# Patient Record
Sex: MALE | Race: WHITE | NOT HISPANIC OR LATINO | Employment: UNEMPLOYED | URBAN - METROPOLITAN AREA
[De-identification: names, ages, dates, MRNs, and addresses within clinical notes are randomized per-mention and may not be internally consistent; named-entity substitution may affect disease eponyms.]

---

## 2021-04-20 ENCOUNTER — APPOINTMENT (OUTPATIENT)
Dept: GENERAL RADIOLOGY | Facility: CLINIC | Age: 36
End: 2021-04-20
Attending: NURSE PRACTITIONER
Payer: MEDICAID

## 2021-04-20 ENCOUNTER — APPOINTMENT (OUTPATIENT)
Dept: CT IMAGING | Facility: CLINIC | Age: 36
End: 2021-04-20
Attending: NURSE PRACTITIONER
Payer: MEDICAID

## 2021-04-20 ENCOUNTER — HOSPITAL ENCOUNTER (EMERGENCY)
Facility: CLINIC | Age: 36
Discharge: HOME | End: 2021-04-20
Attending: NURSE PRACTITIONER | Admitting: NURSE PRACTITIONER
Payer: MEDICAID

## 2021-04-20 ENCOUNTER — APPOINTMENT (OUTPATIENT)
Dept: MRI IMAGING | Facility: CLINIC | Age: 36
End: 2021-04-20
Attending: NURSE PRACTITIONER
Payer: MEDICAID

## 2021-04-20 VITALS
OXYGEN SATURATION: 99 % | DIASTOLIC BLOOD PRESSURE: 69 MMHG | SYSTOLIC BLOOD PRESSURE: 129 MMHG | WEIGHT: 241 LBS | HEART RATE: 101 BPM | TEMPERATURE: 98.1 F | RESPIRATION RATE: 16 BRPM

## 2021-04-20 DIAGNOSIS — R20.2 PARESTHESIAS: ICD-10-CM

## 2021-04-20 DIAGNOSIS — R42 DIZZINESS: ICD-10-CM

## 2021-04-20 LAB
ALBUMIN SERPL-MCNC: 4.1 G/DL (ref 3.4–5)
ALBUMIN UR-MCNC: NEGATIVE MG/DL
ALP SERPL-CCNC: 93 U/L (ref 40–150)
ALT SERPL W P-5'-P-CCNC: 49 U/L (ref 0–70)
ANION GAP SERPL CALCULATED.3IONS-SCNC: 4 MMOL/L (ref 3–14)
APPEARANCE UR: CLEAR
APTT PPP: 26 SEC (ref 22–37)
AST SERPL W P-5'-P-CCNC: 26 U/L (ref 0–45)
BASOPHILS # BLD AUTO: 0.1 10E9/L (ref 0–0.2)
BASOPHILS NFR BLD AUTO: 1.1 %
BILIRUB SERPL-MCNC: 0.5 MG/DL (ref 0.2–1.3)
BILIRUB UR QL STRIP: NEGATIVE
BUN SERPL-MCNC: 20 MG/DL (ref 7–30)
CALCIUM SERPL-MCNC: 9 MG/DL (ref 8.5–10.1)
CHLORIDE SERPL-SCNC: 103 MMOL/L (ref 94–109)
CO2 SERPL-SCNC: 29 MMOL/L (ref 20–32)
COLOR UR AUTO: ABNORMAL
CREAT SERPL-MCNC: 1.05 MG/DL (ref 0.66–1.25)
DIFFERENTIAL METHOD BLD: ABNORMAL
EOSINOPHIL # BLD AUTO: 0.2 10E9/L (ref 0–0.7)
EOSINOPHIL NFR BLD AUTO: 2.9 %
ERYTHROCYTE [DISTWIDTH] IN BLOOD BY AUTOMATED COUNT: 12.4 % (ref 10–15)
GFR SERPL CREATININE-BSD FRML MDRD: >90 ML/MIN/{1.73_M2}
GLUCOSE BLDC GLUCOMTR-MCNC: 83 MG/DL (ref 70–99)
GLUCOSE SERPL-MCNC: 90 MG/DL (ref 70–99)
GLUCOSE UR STRIP-MCNC: NEGATIVE MG/DL
HCT VFR BLD AUTO: 51.5 % (ref 40–53)
HGB BLD-MCNC: 17.9 G/DL (ref 13.3–17.7)
HGB UR QL STRIP: NEGATIVE
IMM GRANULOCYTES # BLD: 0 10E9/L (ref 0–0.4)
IMM GRANULOCYTES NFR BLD: 0.4 %
INR PPP: 1.02 (ref 0.86–1.14)
KETONES UR STRIP-MCNC: NEGATIVE MG/DL
LABORATORY COMMENT REPORT: NORMAL
LEUKOCYTE ESTERASE UR QL STRIP: NEGATIVE
LYMPHOCYTES # BLD AUTO: 1.7 10E9/L (ref 0.8–5.3)
LYMPHOCYTES NFR BLD AUTO: 21.4 %
MAGNESIUM SERPL-MCNC: 2.4 MG/DL (ref 1.6–2.3)
MCH RBC QN AUTO: 31 PG (ref 26.5–33)
MCHC RBC AUTO-ENTMCNC: 34.8 G/DL (ref 31.5–36.5)
MCV RBC AUTO: 89 FL (ref 78–100)
MONOCYTES # BLD AUTO: 0.6 10E9/L (ref 0–1.3)
MONOCYTES NFR BLD AUTO: 7 %
NEUTROPHILS # BLD AUTO: 5.4 10E9/L (ref 1.6–8.3)
NEUTROPHILS NFR BLD AUTO: 67.2 %
NITRATE UR QL: NEGATIVE
NRBC # BLD AUTO: 0 10*3/UL
NRBC BLD AUTO-RTO: 0 /100
PH UR STRIP: 6 PH (ref 5–7)
PLATELET # BLD AUTO: 258 10E9/L (ref 150–450)
POTASSIUM SERPL-SCNC: 3.9 MMOL/L (ref 3.4–5.3)
PROT SERPL-MCNC: 7.5 G/DL (ref 6.8–8.8)
RBC # BLD AUTO: 5.77 10E12/L (ref 4.4–5.9)
SARS-COV-2 RNA RESP QL NAA+PROBE: NEGATIVE
SODIUM SERPL-SCNC: 136 MMOL/L (ref 133–144)
SOURCE: ABNORMAL
SP GR UR STRIP: 1.04 (ref 1–1.03)
SPECIMEN SOURCE: NORMAL
TROPONIN I SERPL-MCNC: <0.015 UG/L (ref 0–0.04)
UROBILINOGEN UR STRIP-MCNC: 0 MG/DL (ref 0–2)
WBC # BLD AUTO: 8 10E9/L (ref 4–11)

## 2021-04-20 PROCEDURE — 250N000009 HC RX 250: Performed by: NURSE PRACTITIONER

## 2021-04-20 PROCEDURE — 99285 EMERGENCY DEPT VISIT HI MDM: CPT | Mod: 25 | Performed by: NURSE PRACTITIONER

## 2021-04-20 PROCEDURE — 70551 MRI BRAIN STEM W/O DYE: CPT

## 2021-04-20 PROCEDURE — 71045 X-RAY EXAM CHEST 1 VIEW: CPT

## 2021-04-20 PROCEDURE — 93005 ELECTROCARDIOGRAM TRACING: CPT | Performed by: NURSE PRACTITIONER

## 2021-04-20 PROCEDURE — 85730 THROMBOPLASTIN TIME PARTIAL: CPT | Performed by: NURSE PRACTITIONER

## 2021-04-20 PROCEDURE — 83735 ASSAY OF MAGNESIUM: CPT | Performed by: NURSE PRACTITIONER

## 2021-04-20 PROCEDURE — 85610 PROTHROMBIN TIME: CPT | Performed by: NURSE PRACTITIONER

## 2021-04-20 PROCEDURE — 93010 ELECTROCARDIOGRAM REPORT: CPT | Performed by: NURSE PRACTITIONER

## 2021-04-20 PROCEDURE — C9803 HOPD COVID-19 SPEC COLLECT: HCPCS | Performed by: NURSE PRACTITIONER

## 2021-04-20 PROCEDURE — 87635 SARS-COV-2 COVID-19 AMP PRB: CPT | Performed by: NURSE PRACTITIONER

## 2021-04-20 PROCEDURE — 85025 COMPLETE CBC W/AUTO DIFF WBC: CPT | Performed by: NURSE PRACTITIONER

## 2021-04-20 PROCEDURE — 70498 CT ANGIOGRAPHY NECK: CPT

## 2021-04-20 PROCEDURE — 250N000011 HC RX IP 250 OP 636: Performed by: NURSE PRACTITIONER

## 2021-04-20 PROCEDURE — 999N001017 HC STATISTIC GLUCOSE BY METER IP

## 2021-04-20 PROCEDURE — 70450 CT HEAD/BRAIN W/O DYE: CPT | Mod: XU

## 2021-04-20 PROCEDURE — 81001 URINALYSIS AUTO W/SCOPE: CPT | Performed by: NURSE PRACTITIONER

## 2021-04-20 PROCEDURE — 80053 COMPREHEN METABOLIC PANEL: CPT | Performed by: NURSE PRACTITIONER

## 2021-04-20 PROCEDURE — 84484 ASSAY OF TROPONIN QUANT: CPT | Performed by: NURSE PRACTITIONER

## 2021-04-20 RX ORDER — LISINOPRIL AND HYDROCHLOROTHIAZIDE 12.5; 2 MG/1; MG/1
1 TABLET ORAL DAILY
COMMUNITY
Start: 2021-03-26

## 2021-04-20 RX ORDER — HYDROCODONE BITARTRATE AND ACETAMINOPHEN 5; 325 MG/1; MG/1
1 TABLET ORAL EVERY 6 HOURS PRN
COMMUNITY
Start: 2021-04-09 | End: 2022-11-11

## 2021-04-20 RX ORDER — IOPAMIDOL 755 MG/ML
500 INJECTION, SOLUTION INTRAVASCULAR ONCE
Status: COMPLETED | OUTPATIENT
Start: 2021-04-20 | End: 2021-04-20

## 2021-04-20 RX ORDER — GABAPENTIN 600 MG/1
600 TABLET ORAL 3 TIMES DAILY
COMMUNITY
Start: 2021-04-09 | End: 2022-10-29 | Stop reason: ALTCHOICE

## 2021-04-20 RX ADMIN — IOPAMIDOL 80 ML: 755 INJECTION, SOLUTION INTRAVENOUS at 15:31

## 2021-04-20 RX ADMIN — SODIUM CHLORIDE 100 ML: 9 INJECTION, SOLUTION INTRAVENOUS at 15:31

## 2021-04-20 ASSESSMENT — ENCOUNTER SYMPTOMS
TREMORS: 0
SEIZURES: 0
ARTHRALGIAS: 1
WEAKNESS: 0
NECK PAIN: 1
ENDOCRINE NEGATIVE: 1
PHOTOPHOBIA: 0
NAUSEA: 0
CARDIOVASCULAR NEGATIVE: 1
VOMITING: 0
NERVOUS/ANXIOUS: 1
FEVER: 0
RESPIRATORY NEGATIVE: 1
HEMATOLOGIC/LYMPHATIC NEGATIVE: 1
CHILLS: 0

## 2021-04-20 NOTE — ED TRIAGE NOTES
Pt here with headache, tingling in right side of back of head.  Pressure in extremities, vision blurry.  Stroke eval called.    BG 83

## 2021-04-20 NOTE — Clinical Note
Landen Arteaga was seen and treated in our emergency department on 4/20/2021.  He may return to work on 04/21/2021.       If you have any questions or concerns, please don't hesitate to call.      Domenica Sanon APRN CNP

## 2021-04-20 NOTE — ED PROVIDER NOTES
History     Chief Complaint   Patient presents with     Headache     HPI  Landen Arteaga is a 35 year old male who has hypertension and anxiety and is a tobacco smoker 1 pack/day resenting to the emergency department with complaint of having diffuse numbness and tingling in his head, tongue tingling, tingling to his face in bilateral upper extremities after he had bent over and lift a box.  This occurred at approximately 1300 hrs.  Patient thought it was his blood pressure and did take an additional blood pressure medication but symptoms continued.  He is noted some blurry vision but no visual loss.  He denies severe thunderclap headache, neck stiffness, extremity weakness, no slurred speech, no facial droop.  Patient reports his symptoms does not feel like his normal anxiety attack.  Reports this has happened in the past to him as well.  Patient reports he would like an MRI to further evaluate for any abnormalities of his brain.    Patient denies chest pain, dyspnea, unusual cough and fever, he is not on any blood thinners, he denies illicit drug use and no history of snuffing.    After evaluation of the patient code stroke was changed to stroke evaluation.    Patient recently had a MRI of the cervical spine for cervical radiculopathy and chronic neck pain.  It appears he had this performed at [facility].    PCP: No primary care provider on file.     Allergies:  No Known Allergies    Problem List:    There are no active problems to display for this patient.       Past Medical History:    History reviewed. No pertinent past medical history.    Past Surgical History:    History reviewed. No pertinent surgical history.    Family History:    History reviewed. No pertinent family history.    Social History:  Marital Status:  Single [1]  Social History     Tobacco Use     Smoking status: Current Every Day Smoker     Packs/day: 1.00     Types: Cigarettes     Smokeless tobacco: Never Used    Substance Use Topics     Alcohol use: Not Currently     Drug use: Never        Medications:    gabapentin (NEURONTIN) 600 MG tablet  HYDROcodone-acetaminophen (NORCO) 5-325 MG tablet  lisinopril-hydrochlorothiazide (ZESTORETIC) 20-12.5 MG tablet          Review of Systems   Constitutional: Negative for chills and fever.   HENT: Negative.    Eyes: Negative for photophobia.   Respiratory: Negative.    Cardiovascular: Negative.    Gastrointestinal: Negative for nausea and vomiting.   Endocrine: Negative.    Genitourinary: Negative.    Musculoskeletal: Positive for arthralgias and neck pain.        Chronic neck and bilateral shoulder pain   Allergic/Immunologic: Negative for immunocompromised state.   Neurological: Negative for tremors, seizures and weakness.   Hematological: Negative.    Psychiatric/Behavioral: The patient is nervous/anxious.        Physical Exam   BP: (!) 148/88  Pulse: 104  Temp: 98.1  F (36.7  C)  Resp: 16  Weight: 109.3 kg (241 lb)  SpO2: 100 %      Physical Exam  Vitals signs and nursing note reviewed.   Constitutional:       Appearance: Normal appearance.   HENT:      Head: Normocephalic and atraumatic.      Jaw: There is normal jaw occlusion.      Mouth/Throat:      Lips: Pink.      Mouth: Mucous membranes are moist.      Tongue: Tongue does not deviate from midline.      Pharynx: Uvula midline.   Eyes:      General: No visual field deficit.     Extraocular Movements: Extraocular movements intact.      Conjunctiva/sclera: Conjunctivae normal.      Pupils: Pupils are equal, round, and reactive to light.   Neck:      Musculoskeletal: Normal range of motion and neck supple. No neck rigidity.      Vascular: No carotid bruit.   Cardiovascular:      Rate and Rhythm: Normal rate and regular rhythm.      Pulses: Normal pulses.      Heart sounds: Normal heart sounds.   Pulmonary:      Effort: Pulmonary effort is normal.      Breath sounds: Normal breath sounds.   Abdominal:      General: Bowel sounds  are normal.      Palpations: Abdomen is soft.   Musculoskeletal:      Right lower leg: No edema.      Left lower leg: No edema.   Skin:     General: Skin is warm and dry.      Capillary Refill: Capillary refill takes less than 2 seconds.   Neurological:      General: No focal deficit present.      Mental Status: He is alert.      GCS: GCS eye subscore is 4. GCS verbal subscore is 5. GCS motor subscore is 6.      Cranial Nerves: No dysarthria or facial asymmetry.      Sensory: Sensation is intact.      Motor: Motor function is intact.      Coordination: Coordination is intact.      Gait: Gait is intact.      Comments: Patient reports slight decrease in sensation to the right aspect of the face compared to the left extremity sensation are equal bilateral  Patient ambulated back from the waiting room to room 6 without any gait ataxia   Psychiatric:         Attention and Perception: Attention normal.         Mood and Affect: Mood is anxious.         Speech: Speech normal.         Behavior: Behavior is cooperative.         ED Course  35-year-old male with history of hyper tension and cervical radiculopathy presenting to the emergency department 2 to 2-1/2 hours after noting tongue tingling and diffuse feeling like his head is numb with mild headache and tingling to his face bilaterally and then noting some subjective decrease sensation to the right aspect of his face compared to the left for stroke evaluation.  On exam patient reports he does have slight decrease in sensation of the right aspect of his face compared to the left otherwise patient is completely neurologically intact with no acute deficits.  Discussed with the patient I would like to proceed with a stroke evaluation.  Patient would like an MRI of his brain to rule out a stroke as he has had these symptoms in the past.  I explained to the patient I would like to initially start with a CT of the head without contrast and then proceed with a CTA of head and  "neck.  I would then consult with stroke neurologist for further recommendations and if MRI is warranted.  Patient verbalized understanding and plan of care.    1608 patient reports he is feeling improved.  Stroke score 0.  Paresthesias to his face head and tongue have abated.  I reviewed with the patient his CTA of head and neck did not show any acute abnormality.  Will place call into stroke neurology to see if MR warranted.     1644: Discussed with stroke neurologist Dr. Meraz whom based on patient's symptoms and the patient does have risk factors of hypertension and smoking history he recommends doing an MR of the brain without contrast and if within normal limits patient may be discharged with follow-up by his PCP.    Notified patient on plan of care he amenable to plan of care although he is disappointed that he has to be in the ED \" all night\" as MR would not be available until 1800.    1900 MRI is within normal limits.  Patient notified he is to follow-up with his PCP in the next 2 days.  Suspect could be related to fluid status or anxiety.        Procedures               EKG Interpretation:      Interpreted by ALEJANDRA Moscoso CNP  Time reviewed: 1554  Symptoms at time of EKG: dizziness   Rhythm: normal sinus   Rate: normal  Axis: normal  Ectopy: none  Conduction: normal  ST Segments/ T Waves: No ST-T wave changes  Q Waves: none  Comparison to prior: changes from 1/25/2006    Clinical Impression: normal EKG           Results for orders placed or performed during the hospital encounter of 04/20/21 (from the past 24 hour(s))   Glucose by meter   Result Value Ref Range    Glucose 83 70 - 99 mg/dL   CBC with platelets differential   Result Value Ref Range    WBC 8.0 4.0 - 11.0 10e9/L    RBC Count 5.77 4.4 - 5.9 10e12/L    Hemoglobin 17.9 (H) 13.3 - 17.7 g/dL    Hematocrit 51.5 40.0 - 53.0 %    MCV 89 78 - 100 fl    MCH 31.0 26.5 - 33.0 pg    MCHC 34.8 31.5 - 36.5 g/dL    RDW 12.4 10.0 - 15.0 %    " Platelet Count 258 150 - 450 10e9/L    Diff Method Automated Method     % Neutrophils 67.2 %    % Lymphocytes 21.4 %    % Monocytes 7.0 %    % Eosinophils 2.9 %    % Basophils 1.1 %    % Immature Granulocytes 0.4 %    Nucleated RBCs 0 0 /100    Absolute Neutrophil 5.4 1.6 - 8.3 10e9/L    Absolute Lymphocytes 1.7 0.8 - 5.3 10e9/L    Absolute Monocytes 0.6 0.0 - 1.3 10e9/L    Absolute Eosinophils 0.2 0.0 - 0.7 10e9/L    Absolute Basophils 0.1 0.0 - 0.2 10e9/L    Abs Immature Granulocytes 0.0 0 - 0.4 10e9/L    Absolute Nucleated RBC 0.0    INR   Result Value Ref Range    INR 1.02 0.86 - 1.14   Partial thromboplastin time   Result Value Ref Range    PTT 26 22 - 37 sec   Comprehensive metabolic panel   Result Value Ref Range    Sodium 136 133 - 144 mmol/L    Potassium 3.9 3.4 - 5.3 mmol/L    Chloride 103 94 - 109 mmol/L    Carbon Dioxide 29 20 - 32 mmol/L    Anion Gap 4 3 - 14 mmol/L    Glucose 90 70 - 99 mg/dL    Urea Nitrogen 20 7 - 30 mg/dL    Creatinine 1.05 0.66 - 1.25 mg/dL    GFR Estimate >90 >60 mL/min/[1.73_m2]    GFR Estimate If Black >90 >60 mL/min/[1.73_m2]    Calcium 9.0 8.5 - 10.1 mg/dL    Bilirubin Total 0.5 0.2 - 1.3 mg/dL    Albumin 4.1 3.4 - 5.0 g/dL    Protein Total 7.5 6.8 - 8.8 g/dL    Alkaline Phosphatase 93 40 - 150 U/L    ALT 49 0 - 70 U/L    AST 26 0 - 45 U/L   Troponin I   Result Value Ref Range    Troponin I ES <0.015 0.000 - 0.045 ug/L   Magnesium   Result Value Ref Range    Magnesium 2.4 (H) 1.6 - 2.3 mg/dL   Asymptomatic SARS-CoV-2 COVID-19 Virus (Coronavirus) by PCR    Specimen: Nasopharyngeal   Result Value Ref Range    SARS-CoV-2 Virus Specimen Source Nasopharyngeal     SARS-CoV-2 PCR Result NEGATIVE     SARS-CoV-2 PCR Comment (Note)    CT Head w/o Contrast    Narrative    CT OF THE HEAD WITHOUT CONTRAST 4/20/2021 3:28 PM     COMPARISON: None.    HISTORY:  Dizziness, non-specific    TECHNIQUE: Axial CT images of the head from the skull base to the  vertex were acquired without IV  contrast.    FINDINGS: The ventricles and basal cisterns are within normal limits  in configuration. There is no midline shift. There are no extra-axial  fluid collections.  Gray-white differentiation is well maintained.    No intracranial hemorrhage, mass or recent infarct.    The visualized paranasal sinuses are well-aerated. There is no  mastoiditis. There are no fractures of the visualized bones.      Impression    IMPRESSION:  Normal head CT.      Radiation dose for this scan was reduced using automated exposure  control, adjustment of the mA and/or kV according to patient size, or  iterative reconstruction technique    PASCUAL HAMEED MD   CTA Head Neck with Contrast    Narrative    CT ANGIOGRAM OF THE HEAD AND NECK WITH CONTRAST  4/20/2021 4:00 PM     HISTORY: Dizziness, non-specific.    TECHNIQUE:  CT angiography with an injection of Isovue-370, 80mL IV  with scans through the head and neck. Images were transferred to a  separate 3-D workstation where multiplanar reformations and 3-D images  were created. Estimates of carotid stenoses are made relative to the  distal internal carotid artery diameters except as noted. Radiation  dose for this scan was reduced using automated exposure control,  adjustment of the mA and/or kV according to patient size, or iterative  reconstruction technique.    COMPARISON: None.     CT HEAD FINDINGS: No contrast enhancing lesions. Cerebral blood flow  is grossly normal.     CT ANGIOGRAM HEAD FINDINGS:  The major intracranial arteries including  the proximal branches of the anterior cerebral, middle cerebral, and  posterior cerebral arteries appear patent without vascular cutoff. No  aneurysm identified. No significant stenosis. Venous circulation is  unremarkable.     The P1 segment of the right posterior cerebral artery is not  visualized and is likely hypoplastic or congenitally absent. The right  posterior cerebral artery is supplied by the patent right  posterior  communicating artery.    CT ANGIOGRAM NECK FINDINGS:   Normal origin of the great vessels from the aortic arch.     Right carotid artery: The right common and internal carotid arteries  are patent. No significant stenosis or atherosclerotic disease in the  carotid artery.     Left carotid artery: The left common and internal carotid arteries are  patent. No significant stenosis or atherosclerotic disease in the  carotid artery.     Vertebral arteries: Vertebral arteries are patent without evidence of  dissection. No significant stenosis.     Other findings: None.       Impression    IMPRESSION: Patent arteries in the head and neck without vascular  cutoff. No evidence of dissection. No aneurysm identified. No  significant stenosis.      LYNNETTE AUGUSTINE MD   UA reflex to Microscopic and Culture    Specimen: Midstream Urine   Result Value Ref Range    Color Urine Straw     Appearance Urine Clear     Glucose Urine Negative NEG^Negative mg/dL    Bilirubin Urine Negative NEG^Negative    Ketones Urine Negative NEG^Negative mg/dL    Specific Gravity Urine 1.036 (H) 1.003 - 1.035    Blood Urine Negative NEG^Negative    pH Urine 6.0 5.0 - 7.0 pH    Protein Albumin Urine Negative NEG^Negative mg/dL    Urobilinogen mg/dL 0.0 0.0 - 2.0 mg/dL    Nitrite Urine Negative NEG^Negative    Leukocyte Esterase Urine Negative NEG^Negative    Source Midstream Urine    XR Chest Port 1 View    Narrative    CHEST ONE VIEW PORTABLE   4/20/2021 4:31 PM     HISTORY: Dizziness.    COMPARISON: None.      Impression    IMPRESSION: Unremarkable single view of the chest.    SUDHAKAR LEDESMA MD   MR Brain w/o Contrast    Narrative    MRI BRAIN WITHOUT CONTRAST  4/20/2021 6:11 PM    HISTORY:  Transient ischemic attack (TIA).    TECHNIQUE:  Multiplanar, multisequence MRI of the brain without  gadolinium IV contrast material.      COMPARISON:  Head CT from earlier the same day.    FINDINGS: There is no evidence of acute infarct, hemorrhage,  mass, or  herniation. The brain parenchyma, ventricles and subarachnoid spaces  appear normal.     The facial structures appear normal.       Impression    IMPRESSION:  Normal MRI of the brain.         Medications   iopamidol (ISOVUE-370) solution 500 mL (80 mLs Intravenous Given 4/20/21 1531)   sodium chloride (PF) 0.9% PF flush 3 mL (10 mLs Intravenous Given 4/20/21 1531)   sodium chloride 0.9 % bag 100mL for CT scan flush use (100 mLs Intravenous Given 4/20/21 1531)       Assessments & Plan (with Medical Decision Making)     I have reviewed the nursing notes.    I have reviewed the findings, diagnosis, plan and need for follow up with the patient.      New Prescriptions    No medications on file       Final diagnoses:   Paresthesias   Dizziness       4/20/2021   [facility] EMERGENCY DEPT     Domenica Sanon, ALEJANDRA CNP  04/20/21 0199

## 2021-04-21 NOTE — DISCHARGE INSTRUCTIONS
MRI was completely normal.  No signs of stroke.  Follow-up with your primary care provider in the next week.

## 2022-10-24 ENCOUNTER — HOSPITAL ENCOUNTER (EMERGENCY)
Facility: CLINIC | Age: 37
Discharge: HOME | End: 2022-10-24
Attending: FAMILY MEDICINE | Admitting: FAMILY MEDICINE
Payer: COMMERCIAL

## 2022-10-24 VITALS
DIASTOLIC BLOOD PRESSURE: 107 MMHG | TEMPERATURE: 98.1 F | RESPIRATION RATE: 16 BRPM | HEART RATE: 94 BPM | OXYGEN SATURATION: 96 % | SYSTOLIC BLOOD PRESSURE: 149 MMHG

## 2022-10-24 DIAGNOSIS — M54.41 ACUTE RIGHT-SIDED LOW BACK PAIN WITH RIGHT-SIDED SCIATICA: ICD-10-CM

## 2022-10-24 PROCEDURE — 250N000013 HC RX MED GY IP 250 OP 250 PS 637: Performed by: FAMILY MEDICINE

## 2022-10-24 PROCEDURE — 99284 EMERGENCY DEPT VISIT MOD MDM: CPT | Performed by: FAMILY MEDICINE

## 2022-10-24 PROCEDURE — 250N000012 HC RX MED GY IP 250 OP 636 PS 637: Performed by: FAMILY MEDICINE

## 2022-10-24 RX ORDER — CYCLOBENZAPRINE HCL 10 MG
10 TABLET ORAL 3 TIMES DAILY PRN
Qty: 20 TABLET | Refills: 0 | Status: SHIPPED | OUTPATIENT
Start: 2022-10-24 | End: 2022-10-31

## 2022-10-24 RX ORDER — PREDNISONE 20 MG/1
60 TABLET ORAL ONCE
Status: COMPLETED | OUTPATIENT
Start: 2022-10-24 | End: 2022-10-24

## 2022-10-24 RX ORDER — OXYCODONE HYDROCHLORIDE 5 MG/1
10 TABLET ORAL ONCE
Status: COMPLETED | OUTPATIENT
Start: 2022-10-24 | End: 2022-10-24

## 2022-10-24 RX ORDER — PREDNISONE 10 MG/1
TABLET ORAL
Qty: 32 TABLET | Refills: 0 | Status: SHIPPED | OUTPATIENT
Start: 2022-10-25

## 2022-10-24 RX ORDER — OXYCODONE HYDROCHLORIDE 5 MG/1
5-10 TABLET ORAL EVERY 4 HOURS PRN
Qty: 16 TABLET | Refills: 0 | Status: SHIPPED | OUTPATIENT
Start: 2022-10-24 | End: 2022-10-29 | Stop reason: ALTCHOICE

## 2022-10-24 RX ORDER — CYCLOBENZAPRINE HCL 10 MG
10 TABLET ORAL ONCE
Status: COMPLETED | OUTPATIENT
Start: 2022-10-24 | End: 2022-10-24

## 2022-10-24 RX ADMIN — PREDNISONE 60 MG: 20 TABLET ORAL at 21:05

## 2022-10-24 RX ADMIN — OXYCODONE HYDROCHLORIDE 10 MG: 5 TABLET ORAL at 21:05

## 2022-10-24 RX ADMIN — CYCLOBENZAPRINE 10 MG: 10 TABLET, FILM COATED ORAL at 21:05

## 2022-10-24 ASSESSMENT — ACTIVITIES OF DAILY LIVING (ADL): ADLS_ACUITY_SCORE: 33

## 2022-10-25 NOTE — ED PROVIDER NOTES
History     Chief Complaint   Patient presents with     Hip Pain     Back Pain     HPI  Landen Arteaga is a 37 year old male who presents to the ED today with low back pain radiating down the right leg about the past week.  Started when he twisted wrong while getting out of bed.  This morning he had difficulty getting up from bed and actually had to roll onto the floor.  Feels like his hip cramps up and takes an hour or so to settle down and subside.  He has numbness down the right leg and into the foot.    Has known degenerative disc disease and just had an MRI of his low back 2 or 3 weeks to go down in [place] and is supposed to follow-up with his spine surgeon on November 3 to go over those results.  He has had some left-sided sciatica in the past which has settled down after a corticosteroid injection.  Recalls having right-sided pain just like this on the bone not quite as severe maybe a year or so ago.  Onset when he was shoveling snow.  He was off work at the time and he just got it out and it subsided eventually over about a months worth that time.  He was never seen for that.  No loss of control of bladder or bowels.  No fevers.    Follows with [facility] spine.      ==========  MRI in [place] August 2022  ================    INDICATION:   Left radiculopathy. Low back pain.     COMPARISON:   03/26/2021.   Technique Sagittal T1, T2, and STIR sequences. Axial T1 and T2 weighted sequences.     FINDINGS:   Normal vertebral body alignment. No fractures. No vertebral body loss of height. No spondylolisthesis. No ligamentous injury.   No suspicious osseous lesions.   Normal conus terminates at L1-2.   T12-L1 L1-2: No spinal canal or neural foraminal narrowing.   L2-3: No spinal canal or neural foraminal narrowing.   L3-4: No spinal canal or neural foraminal narrowing.   L4-5: Disc degeneration. Diffuse disc bulge. Right paracentral disc protrusion measures approximately 5 mm in short axis with an annular  fissure. Mild narrowing of spinal canal. Right subarticular recess narrowing with potential impingement of the traversing right L5 nerve root. No neural foraminal narrowing. Mild facet arthropathy.   L5-S1: Disc degeneration and diffuse disc bulge. Left paracentral subarticular disc protrusion measures approximately 5 mm short axis. No narrowing of spinal canal. Impingement of the traversing left S1 nerve root. Mild narrowing of bilateral foramina. Mild facet arthropathy.   Normal visualized SI joints.     IMPRESSION:   1. Normal alignment. No fractures.   2. At L4-5, disc degeneration diffuse disc bulge. Right paracentral disc protrusion with an annular fissure. Mild narrowing of spinal canal. Potential impingement of the traversing right L5 nerve root.   3. At L5-S1, disc degeneration diffuse disc bulge. Left paracentral and subarticular disc protrusion. No narrowing of spinal canal. Impingement of the traversing left S1 nerve root. Mild narrowing of the bilateral foramina   4. Lumbar spondylosis   ========================================        Allergies:  Allergies   Allergen Reactions     No Known Allergies        Problem List:    There are no problems to display for this patient.       Past Medical History:    No past medical history on file.    Past Surgical History:    No past surgical history on file.    Family History:    No family history on file.    Social History:  Marital Status:  Single [1]  Social History     Tobacco Use     Smoking status: Every Day     Packs/day: 1.00     Types: Cigarettes     Smokeless tobacco: Never   Substance Use Topics     Alcohol use: Not Currently     Drug use: Never        Medications:    cyclobenzaprine (FLEXERIL) 10 MG tablet  oxyCODONE (ROXICODONE) 5 MG tablet  [START ON 10/25/2022] predniSONE (DELTASONE) 10 MG tablet  gabapentin (NEURONTIN) 600 MG tablet  HYDROcodone-acetaminophen (NORCO) 5-325 MG tablet  lisinopril-hydrochlorothiazide (ZESTORETIC) 20-12.5 MG  tablet          Review of Systems   All other systems reviewed and are negative.      Physical Exam   BP: (!) 149/107  Pulse: 97  Temp: 98.1  F (36.7  C)  Resp: 16  SpO2: 96 %      Physical Exam  Constitutional:       General: He is in acute distress (mild).      Appearance: Normal appearance.   Pulmonary:      Effort: Pulmonary effort is normal. No respiratory distress.   Musculoskeletal:      Lumbar back: Tenderness ( Mild in the right paraspinous and sacroiliac region and over the hip but the pain feels like it is much deeper than that.) present. Decreased range of motion. Positive right straight leg raise test.      Right hip: Tenderness (mild laterally but the pain feels deeper) present. Decreased range of motion ( reasonable gentle internal and external rotation flexion and extension but we did not test to aggressively so as not to stir things up.  Pain does not seem to be coming from the hip itself ).   Neurological:      Mental Status: He is alert.         ED Course                 Procedures              Critical Care time:  none               No results found for this or any previous visit (from the past 24 hour(s)).    Medications   predniSONE (DELTASONE) tablet 60 mg (60 mg Oral Given 10/24/22 2105)   oxyCODONE (ROXICODONE) tablet 10 mg (10 mg Oral Given 10/24/22 2105)   cyclobenzaprine (FLEXERIL) tablet 10 mg (10 mg Oral Given 10/24/22 2105)       Assessments & Plan (with Medical Decision Making)  37-year-old with a known history of degenerative disc disease has had some sciatica on the left which has settled down.  Had an episode a year or so ago that lasted for a month and finally quieted down on the right.  He has been battling it again for the last week or so after he twisted while getting up out of bed.  Pain is severe especially in the morning and he has difficulty getting up out of bed.  Pain is in the right low back and radiates all the way down to the foot with some numbness.  No loss of  control of his bladder or bowels.  Straight leg positive on the right.  Hip range of motion is reasonable.  He did not want to overdo it set things off again which is understandable.  He has an appoint with his spine surgeon on November 3 to review his recent MRI that was done 2 or 3 weeks ago, prior to the onset of the symptoms.  He was given oxycodone 10 mg orally in the ED, prednisone 60 mg orally and Flexeril 10 mg.  Limited prescription sent to his pharmacy and I asked him to call a spine surgeon in the morning.       I have reviewed the nursing notes.    I have reviewed the findings, diagnosis, plan and need for follow up with the patient.       New Prescriptions    CYCLOBENZAPRINE (FLEXERIL) 10 MG TABLET    Take 1 tablet (10 mg) by mouth 3 times daily as needed for muscle spasms    OXYCODONE (ROXICODONE) 5 MG TABLET    Take 1-2 tablets (5-10 mg) by mouth every 4 hours as needed for pain    PREDNISONE (DELTASONE) 10 MG TABLET    Daily tapering dose:  60/d x 2day, 40/d x 2 days, 30/d x 2 days, 20/d x 2 days, 10/d x 2 days       Final diagnoses:   Acute right-sided low back pain with right-sided sciatica       10/24/2022   [facility] EMERGENCY DEPT     Rigo Hanks MD  10/24/22 2111

## 2022-10-25 NOTE — DISCHARGE INSTRUCTIONS
Ibuprofen 600 mg and Tylenol 1000 mg every 6 hours as needed for pain.  You can take them at the same time.  Take with food so the Ibuprofen doesn't upset your stomach.  You may use the oxycodone sparingly for more severe pain.  All narcotics can cause drowsiness and constipation so be careful to avoid those problems.  Take an OTC stool softener if needed.  Narcotics also have addictive potential and can actually make you more sensitive to pain in as little as 3 days so only use them for a very short time.  You should not drive or operate machinery while taking narcotics.  Take the prednisone as directed.  You can use the Flexeril as needed for spasm.  Call your spine surgeon in the morning.  It was nice visiting with you this evening.  I hope this settles down quickly for you .    Thank you for choosing [facility]. We appreciate the opportunity to meet your urgent medical needs. Please let us know if we could have done anything to make your stay more satisfying.    After discharge, please closely monitor for any new or worsening symptoms. Return to the Emergency Department if you develop any acute worsening signs or symptoms.    If you had lab work, cultures or imaging studies done during your stay, the final results may still be pending. We will call you if your plan of care needs to change. However, if you are not improving as expected, please follow up with your primary care provider or clinic.     Start any prescription medications that were prescribed to you and take them as directed.     Please see additional handouts that may be pertinent to your condition.          Thank you for choosing [facility]. We appreciate the opportunity to meet your urgent medical needs. Please let us know if we could have done anything to make your stay more satisfying.    After discharge, please closely monitor for any new or worsening symptoms. Return to the Emergency Department if you develop  any acute worsening signs or symptoms.    If you had lab work, cultures or imaging studies done during your stay, the final results may still be pending. We will call you if your plan of care needs to change. However, if you are not improving as expected, please follow up with your primary care provider or clinic.     Start any prescription medications that were prescribed to you and take them as directed.     Please see additional handouts that may be pertinent to your condition.

## 2022-10-25 NOTE — ED TRIAGE NOTES
Pt presents with right hip pain and low back pain . Pain radiating to right leg. Numbness to foot.      Triage Assessment     Row Name 10/24/22 1946       Triage Assessment (Adult)    Airway WDL WDL       Respiratory WDL    Respiratory WDL WDL       Skin Circulation/Temperature WDL    Skin Circulation/Temperature WDL WDL       Cardiac WDL    Cardiac WDL WDL       Peripheral/Neurovascular WDL    Peripheral Neurovascular WDL WDL       Cognitive/Neuro/Behavioral WDL    Cognitive/Neuro/Behavioral WDL WDL

## 2022-10-29 ENCOUNTER — HOSPITAL ENCOUNTER (EMERGENCY)
Facility: CLINIC | Age: 37
Discharge: HOME | End: 2022-10-29
Attending: PHYSICIAN ASSISTANT | Admitting: PHYSICIAN ASSISTANT
Payer: COMMERCIAL

## 2022-10-29 VITALS
DIASTOLIC BLOOD PRESSURE: 97 MMHG | TEMPERATURE: 97.9 F | WEIGHT: 233.9 LBS | OXYGEN SATURATION: 95 % | RESPIRATION RATE: 18 BRPM | SYSTOLIC BLOOD PRESSURE: 143 MMHG | HEART RATE: 109 BPM

## 2022-10-29 DIAGNOSIS — M54.41 RIGHT-SIDED LOW BACK PAIN WITH RIGHT-SIDED SCIATICA: ICD-10-CM

## 2022-10-29 PROCEDURE — 96372 THER/PROPH/DIAG INJ SC/IM: CPT | Mod: XS | Performed by: PHYSICIAN ASSISTANT

## 2022-10-29 PROCEDURE — 250N000011 HC RX IP 250 OP 636: Performed by: PHYSICIAN ASSISTANT

## 2022-10-29 PROCEDURE — 250N000013 HC RX MED GY IP 250 OP 250 PS 637: Performed by: PHYSICIAN ASSISTANT

## 2022-10-29 PROCEDURE — 96374 THER/PROPH/DIAG INJ IV PUSH: CPT | Performed by: PHYSICIAN ASSISTANT

## 2022-10-29 PROCEDURE — 99285 EMERGENCY DEPT VISIT HI MDM: CPT | Mod: 25 | Performed by: PHYSICIAN ASSISTANT

## 2022-10-29 PROCEDURE — 99285 EMERGENCY DEPT VISIT HI MDM: CPT | Performed by: PHYSICIAN ASSISTANT

## 2022-10-29 RX ORDER — IBUPROFEN 200 MG
600 TABLET ORAL EVERY 6 HOURS PRN
COMMUNITY

## 2022-10-29 RX ORDER — GABAPENTIN 300 MG/1
300 CAPSULE ORAL 3 TIMES DAILY
Qty: 30 CAPSULE | Refills: 0 | Status: SHIPPED | OUTPATIENT
Start: 2022-10-29 | End: 2022-11-11

## 2022-10-29 RX ORDER — OXYCODONE HYDROCHLORIDE 5 MG/1
5 TABLET ORAL EVERY 6 HOURS PRN
Qty: 12 TABLET | Refills: 0 | Status: SHIPPED | OUTPATIENT
Start: 2022-10-29 | End: 2022-11-01

## 2022-10-29 RX ORDER — GABAPENTIN 300 MG/1
300 CAPSULE ORAL ONCE
Status: COMPLETED | OUTPATIENT
Start: 2022-10-29 | End: 2022-10-29

## 2022-10-29 RX ORDER — TIZANIDINE 2 MG/1
2 TABLET ORAL 3 TIMES DAILY PRN
Qty: 20 TABLET | Refills: 0 | Status: SHIPPED | OUTPATIENT
Start: 2022-10-29

## 2022-10-29 RX ORDER — FLUOXETINE 40 MG/1
40 CAPSULE ORAL DAILY
COMMUNITY
Start: 2022-10-10

## 2022-10-29 RX ORDER — DIAZEPAM 10 MG/2ML
2.5 INJECTION, SOLUTION INTRAMUSCULAR; INTRAVENOUS ONCE
Status: COMPLETED | OUTPATIENT
Start: 2022-10-29 | End: 2022-10-29

## 2022-10-29 RX ADMIN — HYDROMORPHONE HYDROCHLORIDE 1 MG: 1 INJECTION, SOLUTION INTRAMUSCULAR; INTRAVENOUS; SUBCUTANEOUS at 18:40

## 2022-10-29 RX ADMIN — DIAZEPAM 2.5 MG: 5 INJECTION, SOLUTION INTRAMUSCULAR; INTRAVENOUS at 18:40

## 2022-10-29 RX ADMIN — GABAPENTIN 300 MG: 300 CAPSULE ORAL at 18:41

## 2022-10-29 ASSESSMENT — ACTIVITIES OF DAILY LIVING (ADL): ADLS_ACUITY_SCORE: 35

## 2022-10-29 NOTE — ED TRIAGE NOTES
Pt is reporting he has a low back herniated area that he is not able to control the pain over the past week despite medications, he is seeing his spine doctor on the 3rd but he reports he is not able to even get out of bed it hurts so bad

## 2022-10-29 NOTE — ED PROVIDER NOTES
History     Chief Complaint   Patient presents with     Back Pain     HPI  Landen Arteaga is a 37 year old male who presents for evaluation of increased low back pain over the past 2 weeks.  Was seen in the ED 6 days ago.  Started on prednisone.  Was taking oxycodone for pain.  Ran out of the oxycodone.  Was getting better, but states that he took multiple hours today to get out of bed.  His pain is 10 on a scale of 10 anytime that he tries to move.  Denies any saddle anesthesia, loss of bowel/bladder function, or extremity weakness with foot drop.  Does admit to some weakness occasionally at the ankle with the right leg, but has not noted that in the last couple days.  Reports a recurrent history of low back pain with radiation into the bilateral lower extremities.  Now in the past 2 weeks he has had radiation through the right hip and into the entire leg and foot.  Has sharp stabbing and shockwave type pain.  Numbness and tingling present.  He is set to see his spine surgeon at [facility] [place] on 11/3/2022.  He is not sure he will make it to that appointment.  When he does take his oxycodone, that pain decreases down to 5 on a scale of 10.  Reports having 2 LESI attempts in the past without any improvement in his symptoms.  Apparently his surgeon has told him that he needs surgery to repair his issue.  Just underwent MRI repeat of the lumbar spine within the last couple months.  Denies any dysuria, frequency, urgency, flank pain, or gross hematuria.  No abdominal pain.  No diarrhea constipation.  No fevers or chills.  Does not use any street drugs.            ==========  MRI in [place] August 2022  ================     INDICATION:   Left radiculopathy. Low back pain.     COMPARISON:   03/26/2021.   Technique Sagittal T1, T2, and STIR sequences. Axial T1 and T2 weighted sequences.     FINDINGS:   Normal vertebral body alignment. No fractures. No vertebral body loss of height. No spondylolisthesis.  No ligamentous injury.   No suspicious osseous lesions.   Normal conus terminates at L1-2.   T12-L1 L1-2: No spinal canal or neural foraminal narrowing.   L2-3: No spinal canal or neural foraminal narrowing.   L3-4: No spinal canal or neural foraminal narrowing.   L4-5: Disc degeneration. Diffuse disc bulge. Right paracentral disc protrusion measures approximately 5 mm in short axis with an annular fissure. Mild narrowing of spinal canal. Right subarticular recess narrowing with potential impingement of the traversing right L5 nerve root. No neural foraminal narrowing. Mild facet arthropathy.   L5-S1: Disc degeneration and diffuse disc bulge. Left paracentral subarticular disc protrusion measures approximately 5 mm short axis. No narrowing of spinal canal. Impingement of the traversing left S1 nerve root. Mild narrowing of bilateral foramina. Mild facet arthropathy.   Normal visualized SI joints.     IMPRESSION:   1. Normal alignment. No fractures.   2. At L4-5, disc degeneration diffuse disc bulge. Right paracentral disc protrusion with an annular fissure. Mild narrowing of spinal canal. Potential impingement of the traversing right L5 nerve root.   3. At L5-S1, disc degeneration diffuse disc bulge. Left paracentral and subarticular disc protrusion. No narrowing of spinal canal. Impingement of the traversing left S1 nerve root. Mild narrowing of the bilateral foramina   4. Lumbar spondylosis   ========================================            Excerpt from his ED visit on 10/24/22:       Assessments & Plan (with Medical Decision Making)  37-year-old with a known history of degenerative disc disease has had some sciatica on the left which has settled down.  Had an episode a year or so ago that lasted for a month and finally quieted down on the right.  He has been battling it again for the last week or so after he twisted while getting up out of bed.  Pain is severe especially in the morning and he has difficulty  getting up out of bed.  Pain is in the right low back and radiates all the way down to the foot with some numbness.  No loss of control of his bladder or bowels.  Straight leg positive on the right.  Hip range of motion is reasonable.  He did not want to overdo it set things off again which is understandable.  He has an appoint with his spine surgeon on November 3 to review his recent MRI that was done 2 or 3 weeks ago, prior to the onset of the symptoms.  He was given oxycodone 10 mg orally in the ED, prednisone 60 mg orally and Flexeril 10 mg.  Limited prescription sent to his pharmacy and I asked him to call a spine surgeon in the morning.         I have reviewed the nursing notes.     I have reviewed the findings, diagnosis, plan and need for follow up with the patient.             New Prescriptions     CYCLOBENZAPRINE (FLEXERIL) 10 MG TABLET    Take 1 tablet (10 mg) by mouth 3 times daily as needed for muscle spasms     OXYCODONE (ROXICODONE) 5 MG TABLET    Take 1-2 tablets (5-10 mg) by mouth every 4 hours as needed for pain     PREDNISONE (DELTASONE) 10 MG TABLET    Daily tapering dose:  60/d x 2day, 40/d x 2 days, 30/d x 2 days, 20/d x 2 days, 10/d x 2 days         Final diagnoses:   Acute right-sided low back pain with right-sided sciatica         10/24/2022   [facility] EMERGENCY DEPT     Rigo Hanks MD  10/24/22 2111        Allergies:  Allergies   Allergen Reactions     No Known Allergies        Problem List:    There are no problems to display for this patient.       Past Medical History:    History reviewed. No pertinent past medical history.    Past Surgical History:    History reviewed. No pertinent surgical history.    Family History:    History reviewed. No pertinent family history.    Social History:  Marital Status:  Single [1]  Social History     Tobacco Use     Smoking status: Every Day     Packs/day: 1.00     Types: Cigarettes     Smokeless tobacco: Never    Substance Use Topics     Alcohol use: Not Currently     Drug use: Never        Medications:    cyclobenzaprine (FLEXERIL) 10 MG tablet  FLUoxetine (PROZAC) 40 MG capsule  gabapentin (NEURONTIN) 300 MG capsule  ibuprofen (ADVIL/MOTRIN) 200 MG tablet  lisinopril-hydrochlorothiazide (ZESTORETIC) 20-12.5 MG tablet  oxyCODONE (ROXICODONE) 5 MG tablet  predniSONE (DELTASONE) 10 MG tablet  tiZANidine (ZANAFLEX) 2 MG tablet  HYDROcodone-acetaminophen (NORCO) 5-325 MG tablet          Review of Systems   All other systems reviewed and are negative.      Physical Exam   BP: (!) 180/115  Pulse: (!) 126  Temp: 97.9  F (36.6  C)  Resp: 18  Weight: 106.1 kg (233 lb 14.4 oz)  SpO2: 96 %      Physical Exam  Vitals and nursing note reviewed.   Constitutional:       General: He is not in acute distress.     Appearance: He is not diaphoretic.   HENT:      Head: Normocephalic and atraumatic.      Right Ear: External ear normal.      Left Ear: External ear normal.      Nose: Nose normal.      Mouth/Throat:      Mouth: Oropharynx is clear and moist.      Pharynx: No oropharyngeal exudate.   Eyes:      General: No scleral icterus.        Right eye: No discharge.         Left eye: No discharge.      Extraocular Movements: EOM normal.      Conjunctiva/sclera: Conjunctivae normal.      Pupils: Pupils are equal, round, and reactive to light.   Neck:      Thyroid: No thyromegaly.   Cardiovascular:      Rate and Rhythm: Normal rate and regular rhythm.      Heart sounds: Normal heart sounds. No murmur heard.  Pulmonary:      Effort: Pulmonary effort is normal. No respiratory distress.      Breath sounds: Normal breath sounds. No wheezing or rales.   Chest:      Chest wall: No tenderness.   Abdominal:      General: Bowel sounds are normal. There is no distension.      Palpations: Abdomen is soft. There is no mass.      Tenderness: There is no abdominal tenderness. There is no guarding or rebound.   Musculoskeletal:         General: No edema.       Cervical back: Normal range of motion and neck supple.      Comments: Lumbosacral spine area reveals no mass but there is tenderness of the paravertebral muscles. Limited and painful lumbosacral range of motion is noted. Straight leg raise is positive at 30 degrees on both sides. DTR's, motor strength and sensation normal, including heel and toe gait.  Peripheral pulses are palpable. Hips and knees have full range of motion without pain.      Lymphadenopathy:      Cervical: No cervical adenopathy.   Skin:     General: Skin is warm and dry.      Capillary Refill: Capillary refill takes less than 2 seconds.      Findings: No erythema or rash.   Neurological:      Mental Status: He is alert and oriented to person, place, and time.      Cranial Nerves: No cranial nerve deficit.   Psychiatric:         Mood and Affect: Mood and affect normal.         Behavior: Behavior normal.         Thought Content: Thought content normal.         ED Course                 Procedures              Critical Care time:  none               No results found for this or any previous visit (from the past 24 hour(s)).    Medications   gabapentin (NEURONTIN) capsule 300 mg (300 mg Oral Given 10/29/22 1841)   HYDROmorphone (DILAUDID) injection 1 mg (1 mg Intravenous Given 10/29/22 1840)   diazepam (VALIUM) injection 2.5 mg (2.5 mg Intramuscular Given 10/29/22 1840)       Assessments & Plan (with Medical Decision Making)  Right-sided low back pain with right-sided sciatica     37 year old male with known history of two-level lumbar disc herniation and scheduled spine surgery follow-up on 11/3/2022 for evaluation of pain exacerbation and running out of pain medication.  Pain decreases down to 5 on a scale of 10 when he does take all of his medications including prednisone, Flexeril, and oxycodone.  He is hoping for better improvement.  He does admit to doing chores yesterday and doing some lifting.  No red flag symptoms.  See HPI above for  details.  Pain is currently rated 10 on a scale of 10.  On exam blood pressure 143/97.  It was initially quite high upon evaluation, but it improved over time with pain management.  Temperature 97.9, pulse 103, respiration 18, oxygen saturation 95% on room air.  Patient appears to be in discomfort.  Tenderness of the lumbar paravertebral musculature.  Pain with any range of motion.  SLR positive immediately.  No lower extremity weakness noted.  DTRs and sensation intact.  No other worrisome exam findings.  Treatment with p.o. gabapentin, IM Dilaudid, and IM Valium.  Patient reported a significant decrease in his pain down to 4-5 on a scale of 10.  He is very pleased with this.  He was able to ambulate here in the ED.  He felt his pain decreased down to a level that would allow him to be more comfortable at home.  Thankfully, he does not have any red flag symptoms.  We do not have MRI capability during the weekend here.  Discussed that if he does develop red flag symptoms, of which we reviewed with him in detail, that he should return to the [facility] as he may potentially need emergent repeat MRI.  We will change his regimen at home.  Refill of oxycodone.  Changed his muscle relaxer to tizanidine.  Also added gabapentin.  He reports not having problems with gabapentin in the past.  Has not had this medication for at least the last 8-9 months.  He will contact his surgeon in 2 days, Monday, and update them regarding his exacerbated symptoms.  I reiterated the recommendation of no bending, no lifting, and no twisting at this time.  I offered a note for work, but he states that he is essentially self-employed.  Indications for ED return reviewed.  He was in agreement.  His aunt was present in the ED to drive him home.     I have reviewed the nursing notes.    I have reviewed the findings, diagnosis, plan and need for follow up with the patient.       Discharge Medication List as of 10/29/2022  7:45 PM       START taking these medications    Details   gabapentin (NEURONTIN) 300 MG capsule Take 1 capsule (300 mg) by mouth 3 times daily, Disp-30 capsule, R-0, E-Prescribe      tiZANidine (ZANAFLEX) 2 MG tablet Take 1 tablet (2 mg) by mouth 3 times daily as needed for muscle spasms, Disp-20 tablet, R-0, E-Prescribe             Final diagnoses:   Right-sided low back pain with right-sided sciatica     Disclaimer: This note consists of symbols derived from keyboarding, dictation and/or voice recognition software. As a result, there may be errors in the script that have gone undetected. Please consider this when interpreting information found in this chart.      10/29/2022   [facility] EMERGENCY DEPT     Byron Philip PA-C  10/29/22 1959

## 2022-10-30 NOTE — DISCHARGE INSTRUCTIONS
It was a pleasure working with you today!  I hope your condition improves rapidly!     Please REST!!!  Do not bend, lift, or twist until released to do so by your spine surgeon.    Please switch her muscle relaxer to tizanidine which was prescribed.  Be careful with sedation.  It is okay to use ibuprofen 600 mg every 6 hours as needed for inflammation and pain.  Continue taking her prednisone.  You can also take Tylenol 1000 mg every 6 hours needed for pain.  Reserve the oxycodone for severe breakthrough pain.  No driving for 8 hours after taking tizanidine or oxycodone.  Start taking the gabapentin as well on a regular basis.

## 2022-11-11 ENCOUNTER — HOSPITAL ENCOUNTER (EMERGENCY)
Facility: CLINIC | Age: 37
Discharge: HOME | End: 2022-11-11
Attending: EMERGENCY MEDICINE | Admitting: EMERGENCY MEDICINE
Payer: COMMERCIAL

## 2022-11-11 VITALS
OXYGEN SATURATION: 98 % | TEMPERATURE: 98 F | HEART RATE: 84 BPM | DIASTOLIC BLOOD PRESSURE: 97 MMHG | WEIGHT: 231 LBS | SYSTOLIC BLOOD PRESSURE: 147 MMHG | RESPIRATION RATE: 20 BRPM | HEIGHT: 74 IN | BODY MASS INDEX: 29.65 KG/M2

## 2022-11-11 DIAGNOSIS — M51.26 HERNIATED INTERVERTEBRAL DISC OF LUMBAR SPINE: ICD-10-CM

## 2022-11-11 PROCEDURE — 99284 EMERGENCY DEPT VISIT MOD MDM: CPT | Performed by: EMERGENCY MEDICINE

## 2022-11-11 RX ORDER — HYDROCODONE BITARTRATE AND ACETAMINOPHEN 5; 325 MG/1; MG/1
1 TABLET ORAL EVERY 6 HOURS PRN
Qty: 20 TABLET | Refills: 0 | Status: SHIPPED | OUTPATIENT
Start: 2022-11-11 | End: 2022-11-14

## 2022-11-11 RX ORDER — GABAPENTIN 300 MG/1
300 CAPSULE ORAL 3 TIMES DAILY PRN
Qty: 60 CAPSULE | Refills: 1 | Status: SHIPPED | OUTPATIENT
Start: 2022-11-11

## 2022-11-11 ASSESSMENT — ACTIVITIES OF DAILY LIVING (ADL): ADLS_ACUITY_SCORE: 33

## 2022-11-11 NOTE — ED PROVIDER NOTES
"  History     Chief Complaint   Patient presents with     Back Pain     HPI  Landen Arteaga is a 37 year old male who presents with ongoing back pain.  He has 2 known herniated disc in his lumbar spine.  He has been seen by [facility] and anticipated surgery but unfortunately he is waiting on insurance approval.  Pain is rating down his right leg.  It was reasonably controlled until last night when it became more severe.  He has had no loss of bowel or bladder.  He does have some right foot drag but states this is been ongoing for weeks and [facility] knows about this.  No new weakness.  Pain is severe    Allergies:  Allergies   Allergen Reactions     No Known Allergies        Problem List:    There are no problems to display for this patient.       Past Medical History:    No past medical history on file.    Past Surgical History:    No past surgical history on file.    Family History:    No family history on file.    Social History:  Marital Status:  Single [1]  Social History     Tobacco Use     Smoking status: Every Day     Packs/day: 1.00     Types: Cigarettes     Smokeless tobacco: Never   Substance Use Topics     Alcohol use: Not Currently     Drug use: Never        Medications:    gabapentin (NEURONTIN) 300 MG capsule  HYDROcodone-acetaminophen (NORCO) 5-325 MG tablet  FLUoxetine (PROZAC) 40 MG capsule  ibuprofen (ADVIL/MOTRIN) 200 MG tablet  lisinopril-hydrochlorothiazide (ZESTORETIC) 20-12.5 MG tablet  predniSONE (DELTASONE) 10 MG tablet  tiZANidine (ZANAFLEX) 2 MG tablet          Review of Systems  All other systems are reviewed and are negative    Physical Exam   BP: (!) 147/97  Pulse: 84  Temp: 98  F (36.7  C)  Resp: 20  Height: 188 cm (6' 2\")  Weight: 104.8 kg (231 lb)  SpO2: 98 %      Physical Exam  Vitals and nursing note reviewed.   Constitutional:       General: He is not in acute distress.     Appearance: He is well-developed and well-nourished. He is not diaphoretic.   HENT:      " Head: Normocephalic and atraumatic.      Mouth/Throat:      Mouth: Oropharynx is clear and moist.   Eyes:      General: No scleral icterus.        Right eye: No discharge.         Left eye: No discharge.      Conjunctiva/sclera: Conjunctivae normal.   Cardiovascular:      Rate and Rhythm: Normal rate and regular rhythm.      Heart sounds: Normal heart sounds. No murmur heard.  Pulmonary:      Effort: Pulmonary effort is normal. No respiratory distress.      Breath sounds: Normal breath sounds. No stridor.   Abdominal:      Palpations: Abdomen is soft.      Tenderness: There is no abdominal tenderness.   Musculoskeletal:         General: Normal range of motion.      Cervical back: Normal range of motion and neck supple.      Comments: Strength intact in lower extremities   Skin:     General: Skin is warm and dry.      Coloration: Skin is not pale.      Findings: No erythema or rash.   Neurological:      Mental Status: He is alert.      Cranial Nerves: No cranial nerve deficit.      Motor: No abnormal muscle tone.   Psychiatric:         Mood and Affect: Mood and affect normal.         ED Course                 Procedures              Critical Care time:  none               No results found for this or any previous visit (from the past 24 hour(s)).    Medications - No data to display    Assessments & Plan (with Medical Decision Making)  37-year-old male with degenerative disc disease of the lumbar spine, 2 herniated disks.  He is awaiting surgery.  Pain is uncontrolled.  He states [facility] will not refill his pain medications or manage this until time of surgery.  No significant history in looking in the prescription database monitoring program for opioid use.  Did refill his hydrocodone and refill his gabapentin.  Have recommended following up on Monday with [facility] and [place] his primary care next week to help with ongoing pain meds as he can.     I have reviewed the nursing notes.    I have reviewed  the findings, diagnosis, plan and need for follow up with the patient.       New Prescriptions    HYDROCODONE-ACETAMINOPHEN (NORCO) 5-325 MG TABLET    Take 1 tablet by mouth every 6 hours as needed for severe pain       Final diagnoses:   Herniated intervertebral disc of lumbar spine       11/11/2022   [facility] EMERGENCY DEPT     Chalo Kenny MD  11/11/22 5398

## 2022-11-11 NOTE — DISCHARGE INSTRUCTIONS
May continue with ibuprofen up to 600 mg 4 times a day.  Recommend calling [facility] on Monday to ask about timing of surgery.  Also recommend reaching out to her primary care to let them know about your back troubles and may need ongoing pain management until surgery

## 2022-11-11 NOTE — ED TRIAGE NOTES
Pt presents with concerns of continued back pain.  Pt states that the last few days the pain has gotten worse.  Ibuprofen at 1000, Gabapentin, and a muscle relaxer last at 1300.  Pt states that this has not helped the pain, just made him sleepy.  Pt has a [facility] appointment waiting for insurance to approve surgery.  He states that [facility] stated that they can not provide him anything for pain.      Triage Assessment     Row Name 11/11/22 1990       Triage Assessment (Adult)    Airway WDL WDL       Respiratory WDL    Respiratory WDL WDL       Skin Circulation/Temperature WDL    Skin Circulation/Temperature WDL WDL       Cardiac WDL    Cardiac WDL WDL       Peripheral/Neurovascular WDL    Peripheral Neurovascular WDL WDL       Cognitive/Neuro/Behavioral WDL    Cognitive/Neuro/Behavioral WDL WDL

## 2023-09-03 ENCOUNTER — NURSE TRIAGE (OUTPATIENT)
Dept: NURSING | Facility: CLINIC | Age: 38
End: 2023-09-03
Payer: COMMERCIAL

## 2023-09-03 NOTE — TELEPHONE ENCOUNTER
"The patient has been drinking excessively with little to no fluids or foods  He reports he was outside washing his car and sweating  He reports his vision went \"White\" he says he couldn't see  He reports he has increased his fluids with sugar in the fluids  He reports his arm is cold and shriveled up and is tingling\" inside of his head\" right cold arm  Triage guidelines recommend to go to ED now  Caller verbalized and understands directives    Reason for Disposition   [1] Tingling (e.g., pins and needles) of the face, arm / hand, or leg / foot on one side of the body AND [2] present now (Exceptions: Chronic or recurrent symptom lasting > 4 weeks; or tingling from known cause, such as: bumped elbow, carpal tunnel syndrome, pinched nerve, frostbite.)   Patient sounds very sick or weak to the triager    Additional Information   Negative: [1] SEVERE weakness (i.e., unable to walk or barely able to walk, requires support) AND [2] new-onset or worsening   Negative: [1] Weakness (i.e., paralysis, loss of muscle strength) of the face, arm / hand, or leg / foot on one side of the body AND [2] sudden onset AND [3] present now  (Exception: Bell's palsy suspected [i.e., weakness only on one side of the face, developing over hours to days, no other symptoms].)   Negative: [1] Numbness (i.e., loss of sensation) of the face, arm / hand, or leg / foot on one side of the body AND [2] sudden onset AND [3] present now   Negative: [1] Loss of speech or garbled speech AND [2] sudden onset AND [3] present now   Negative: Difficult to awaken or acting confused (e.g., disoriented, slurred speech)   Negative: Sounds like a life-threatening emergency to the triager   Negative: [1] Loss of speech or garbled speech AND [2] gradual onset (e.g., days to weeks) AND [3] present now   Negative: [1] Numbness (i.e., loss of sensation) of the face, arm / hand, or leg / foot on one side of the body AND [2] gradual onset (e.g., days to weeks) AND [3] " present now   Negative: Back pain (and neurologic deficit)   Negative: [1] Weakness of the face, arm / hand, or leg / foot on one side of the body AND [2] gradual onset (e.g., days to weeks) AND [3] present now   Negative: Neck pain (and neurologic deficit)   Negative: [1] Weakness (i.e., paralysis, loss of muscle strength) of the face, arm / hand, or leg / foot on one side of the body AND [2] sudden onset AND [3] brief (now gone)   Negative: [1] Numbness (i.e., loss of sensation) of the face, arm / hand, or leg / foot on one side of the body AND [2] sudden onset AND [3] brief (now gone)   Negative: [1] Loss of speech or garbled speech AND [2] sudden onset AND [3] brief (now gone)   Negative: Bell's palsy suspected (i.e., weakness on only one side of the face, developing over hours to days, no other symptoms)    Protocols used: Neurologic Deficit-A-AH

## 2024-03-12 ENCOUNTER — HOSPITAL ENCOUNTER (EMERGENCY)
Facility: CLINIC | Age: 39
Discharge: HOME | End: 2024-03-12
Attending: NURSE PRACTITIONER | Admitting: NURSE PRACTITIONER
Payer: COMMERCIAL

## 2024-03-12 ENCOUNTER — APPOINTMENT (OUTPATIENT)
Dept: GENERAL RADIOLOGY | Facility: CLINIC | Age: 39
End: 2024-03-12
Attending: NURSE PRACTITIONER
Payer: COMMERCIAL

## 2024-03-12 VITALS
RESPIRATION RATE: 19 BRPM | TEMPERATURE: 99.7 F | HEIGHT: 74 IN | BODY MASS INDEX: 29.9 KG/M2 | HEART RATE: 96 BPM | OXYGEN SATURATION: 96 % | WEIGHT: 233 LBS | DIASTOLIC BLOOD PRESSURE: 61 MMHG | SYSTOLIC BLOOD PRESSURE: 136 MMHG

## 2024-03-12 DIAGNOSIS — J10.1 INFLUENZA B: ICD-10-CM

## 2024-03-12 LAB
FLUAV RNA SPEC QL NAA+PROBE: NEGATIVE
FLUBV RNA RESP QL NAA+PROBE: POSITIVE
RSV RNA SPEC NAA+PROBE: NEGATIVE
SARS-COV-2 RNA RESP QL NAA+PROBE: NEGATIVE

## 2024-03-12 PROCEDURE — 250N000013 HC RX MED GY IP 250 OP 250 PS 637: Performed by: EMERGENCY MEDICINE

## 2024-03-12 PROCEDURE — 71045 X-RAY EXAM CHEST 1 VIEW: CPT

## 2024-03-12 PROCEDURE — 99284 EMERGENCY DEPT VISIT MOD MDM: CPT | Mod: 25 | Performed by: NURSE PRACTITIONER

## 2024-03-12 PROCEDURE — 87637 SARSCOV2&INF A&B&RSV AMP PRB: CPT | Performed by: NURSE PRACTITIONER

## 2024-03-12 PROCEDURE — 99283 EMERGENCY DEPT VISIT LOW MDM: CPT | Performed by: NURSE PRACTITIONER

## 2024-03-12 RX ORDER — IBUPROFEN 200 MG
400 TABLET ORAL ONCE
Status: COMPLETED | OUTPATIENT
Start: 2024-03-12 | End: 2024-03-12

## 2024-03-12 RX ORDER — ACETAMINOPHEN 325 MG/1
650 TABLET ORAL ONCE
Status: COMPLETED | OUTPATIENT
Start: 2024-03-12 | End: 2024-03-12

## 2024-03-12 RX ADMIN — IBUPROFEN 400 MG: 200 TABLET, FILM COATED ORAL at 19:05

## 2024-03-12 RX ADMIN — ACETAMINOPHEN 650 MG: 325 TABLET, FILM COATED ORAL at 19:05

## 2024-03-12 ASSESSMENT — COLUMBIA-SUICIDE SEVERITY RATING SCALE - C-SSRS
1. IN THE PAST MONTH, HAVE YOU WISHED YOU WERE DEAD OR WISHED YOU COULD GO TO SLEEP AND NOT WAKE UP?: NO
2. HAVE YOU ACTUALLY HAD ANY THOUGHTS OF KILLING YOURSELF IN THE PAST MONTH?: NO
6. HAVE YOU EVER DONE ANYTHING, STARTED TO DO ANYTHING, OR PREPARED TO DO ANYTHING TO END YOUR LIFE?: NO

## 2024-03-12 ASSESSMENT — ACTIVITIES OF DAILY LIVING (ADL): ADLS_ACUITY_SCORE: 35

## 2024-03-13 NOTE — DISCHARGE INSTRUCTIONS
Tylenol 650 mg every 4-6 hours as needed for pain. OR Extra strength tylenol 1000 mg every 8 hours as needed.  Ibuprofen 400-600 mg every 6-8 hours as needed for pain  (take with food, stop if causing stomach pains.)  Robitussin DM over the counter for cough.  Drink plenty of fluids.

## 2024-03-13 NOTE — ED PROVIDER NOTES
"  History     Chief Complaint   Patient presents with    Cough     HPI  Landen Artaega is a 38 year old male who presents for evaluation of fever, cough, and body aches.  Symptoms started 5 days ago.  Cough is productive.  Difficulty sleeping due to the cough and bodyaches.  No shortness of breath.  Chest hurts when coughing.  Current everyday smoker.  No known ill contacts.    Allergies:  Allergies   Allergen Reactions    No Known Allergies        Problem List:    There are no problems to display for this patient.       Past Medical History:    No past medical history on file.    Past Surgical History:    No past surgical history on file.    Family History:    No family history on file.    Social History:  Marital Status:  Single [1]  Social History     Tobacco Use    Smoking status: Every Day     Packs/day: 1     Types: Cigarettes    Smokeless tobacco: Never   Substance Use Topics    Alcohol use: Not Currently    Drug use: Never        Medications:    FLUoxetine (PROZAC) 40 MG capsule  gabapentin (NEURONTIN) 300 MG capsule  ibuprofen (ADVIL/MOTRIN) 200 MG tablet  lisinopril-hydrochlorothiazide (ZESTORETIC) 20-12.5 MG tablet  predniSONE (DELTASONE) 10 MG tablet  tiZANidine (ZANAFLEX) 2 MG tablet          Review of Systems  As mentioned above in the history present illness. All other systems were reviewed and are negative.    Physical Exam   BP: 128/85  Pulse: 104  Temp: 99.7  F (37.6  C)  Resp: 20  Height: 188 cm (6' 2\")  Weight: 105.7 kg (233 lb)  SpO2: 98 %      Physical Exam  Constitutional:       General: He is not in acute distress.     Appearance: He is well-developed. He is ill-appearing.   HENT:      Head: Normocephalic and atraumatic.      Right Ear: External ear normal.      Left Ear: External ear normal.      Nose: Congestion present.      Mouth/Throat:      Mouth: Mucous membranes are moist.   Eyes:      Conjunctiva/sclera: Conjunctivae normal.   Cardiovascular:      Rate and Rhythm: Normal rate and " regular rhythm.      Heart sounds: Normal heart sounds. No murmur heard.  Pulmonary:      Effort: Pulmonary effort is normal. No respiratory distress.      Breath sounds: Normal breath sounds.   Musculoskeletal:         General: Normal range of motion.   Skin:     General: Skin is warm and dry.      Findings: No rash.   Neurological:      General: No focal deficit present.      Mental Status: He is alert and oriented to person, place, and time.         ED Course        Procedures         Results for orders placed or performed during the hospital encounter of 03/12/24 (from the past 24 hour(s))   Symptomatic Influenza A/B, RSV, & SARS-CoV2 PCR (COVID-19) Nose    Specimen: Nose; Swab   Result Value Ref Range    Influenza A PCR Negative Negative    Influenza B PCR Positive (A) Negative    RSV PCR Negative Negative    SARS CoV2 PCR Negative Negative    Narrative    Testing was performed using the Xpert Xpress CoV2/Flu/RSV Assay on the Cepheid GeneXpert Instrument. This test should be ordered for the detection of SARS-CoV-2, influenza, and RSV viruses in individuals who meet clinical and/or epidemiological criteria. Test performance is unknown in asymptomatic patients. This test is for in vitro diagnostic use under the FDA EUA for laboratories certified under CLIA to perform high or moderate complexity testing. This test has not been FDA cleared or approved. A negative result does not rule out the presence of PCR inhibitors in the specimen or target RNA in concentration below the limit of detection for the assay. If only one viral target is positive but coinfection with multiple targets is suspected, the sample should be re-tested with another FDA cleared, approved, or authorized test, if coinfection would change clinical management. This test was validated by the [facility]. These laboratories are certified under the Clinical Laboratory Improvement Amendments of 1988 (CLIA-88) as qualified to perform  high complexity laboratory testing.   XR Chest Port 1 View    Narrative    EXAM: XR CHEST PORT 1 VIEW  LOCATION: [facility]  DATE: 3/12/2024    INDICATION: cough  COMPARISON: 04/20/2021      Impression    IMPRESSION: No acute cardiopulmonary abnormalities. Prior postoperative changes of the upper spine. Mild to moderate hypertrophic changes right AC joint.       Medications   acetaminophen (TYLENOL) tablet 650 mg (650 mg Oral $Given 3/12/24 1905)   ibuprofen (ADVIL/MOTRIN) tablet 400 mg (400 mg Oral $Given 3/12/24 1905)       Assessments & Plan (with Medical Decision Making)     History and exam is consistent with a viral respiratory illness.  Patient positive for influenza B.  Chest x-ray is negative for infiltrate or consolidation suggest pneumonia.  I discussed the course of viral illness and symptomatic treatment with patient.  Worrisome reasons to recheck discussed.    New Prescriptions    No medications on file       Final diagnoses:   Influenza B       3/12/2024   [facility] EMERGENCY DEPT       Janeth Coon APRN CNP  03/12/24 2003

## 2024-06-28 ENCOUNTER — HOSPITAL ENCOUNTER (OUTPATIENT)
Dept: GENERAL RADIOLOGY | Facility: CLINIC | Age: 39
Discharge: HOME | End: 2024-06-28
Attending: PHYSICIAN ASSISTANT | Admitting: PHYSICIAN ASSISTANT
Payer: COMMERCIAL

## 2024-06-28 DIAGNOSIS — Z98.1 ARTHRODESIS STATUS: ICD-10-CM

## 2024-06-28 PROCEDURE — 72040 X-RAY EXAM NECK SPINE 2-3 VW: CPT
